# Patient Record
Sex: FEMALE
[De-identification: names, ages, dates, MRNs, and addresses within clinical notes are randomized per-mention and may not be internally consistent; named-entity substitution may affect disease eponyms.]

---

## 2018-06-23 ENCOUNTER — HOSPITAL ENCOUNTER (INPATIENT)
Dept: HOSPITAL 31 - C.EROB | Age: 33
LOS: 3 days | Discharge: HOME | DRG: 371 | End: 2018-06-26
Attending: OBSTETRICS & GYNECOLOGY | Admitting: OBSTETRICS & GYNECOLOGY
Payer: COMMERCIAL

## 2018-06-23 VITALS — BODY MASS INDEX: 40.5 KG/M2

## 2018-06-23 DIAGNOSIS — O41.03X0: Primary | ICD-10-CM

## 2018-06-23 DIAGNOSIS — Z3A.39: ICD-10-CM

## 2018-06-23 LAB
ALBUMIN SERPL-MCNC: 3.2 G/DL (ref 3.5–5)
ALBUMIN/GLOB SERPL: 1 {RATIO} (ref 1–2.1)
ALT SERPL-CCNC: 23 U/L (ref 9–52)
AST SERPL-CCNC: 23 U/L (ref 14–36)
BACTERIA #/AREA URNS HPF: (no result) /[HPF]
BASOPHILS # BLD AUTO: 0 K/UL (ref 0–0.2)
BASOPHILS NFR BLD: 0.2 % (ref 0–2)
BILIRUB UR-MCNC: NEGATIVE MG/DL
BUN SERPL-MCNC: 17 MG/DL (ref 7–17)
CALCIUM SERPL-MCNC: 8.9 MG/DL (ref 8.6–10.4)
EOSINOPHIL # BLD AUTO: 0.1 K/UL (ref 0–0.7)
EOSINOPHIL NFR BLD: 1.7 % (ref 0–4)
ERYTHROCYTE [DISTWIDTH] IN BLOOD BY AUTOMATED COUNT: 19.4 % (ref 11.5–14.5)
GFR NON-AFRICAN AMERICAN: > 60
GLUCOSE UR STRIP-MCNC: (no result) MG/DL
HGB BLD-MCNC: 11.4 G/DL (ref 11–16)
LEUKOCYTE ESTERASE UR-ACNC: (no result) LEU/UL
LYMPHOCYTES # BLD AUTO: 1.8 K/UL (ref 1–4.3)
LYMPHOCYTES NFR BLD AUTO: 20.6 % (ref 20–40)
MCH RBC QN AUTO: 25 PG (ref 27–31)
MCHC RBC AUTO-ENTMCNC: 32.8 G/DL (ref 33–37)
MCV RBC AUTO: 76.2 FL (ref 81–99)
MONOCYTES # BLD: 0.7 K/UL (ref 0–0.8)
MONOCYTES NFR BLD: 7.8 % (ref 0–10)
NEUTROPHILS # BLD: 6 K/UL (ref 1.8–7)
NEUTROPHILS NFR BLD AUTO: 69.7 % (ref 50–75)
NRBC BLD AUTO-RTO: 0.1 % (ref 0–2)
PH UR STRIP: 5 [PH] (ref 5–8)
PLATELET # BLD: 181 K/UL (ref 130–400)
PMV BLD AUTO: 10.2 FL (ref 7.2–11.7)
PROT UR STRIP-MCNC: (no result) MG/DL
RBC # BLD AUTO: 4.55 MIL/UL (ref 3.8–5.2)
RBC # UR STRIP: NEGATIVE /UL
SP GR UR STRIP: 1.03 (ref 1–1.03)
SQUAMOUS EPITHIAL: 12 /HPF (ref 0–5)
UROBILINOGEN UR-MCNC: NORMAL MG/DL (ref 0.2–1)
WBC # BLD AUTO: 8.6 K/UL (ref 4.8–10.8)

## 2018-06-23 NOTE — OBPN
===========================

Datetime: 2018 20:29

===========================

   

IP Progress Impression:  Arrest of dilatation/descent

IP Progress Plan:  Deliver- Section

Membranes, Provider:  Ruptured

Contraction Comments Provider:  q 2 

FHR - Baseline A Provider:  125

Gestation - Est Wks by US:  39.2

Fetal Presentation-Admit:  Vertex

IP Progress Note Comment:  pt seen and examined for progression of labor

   pt advsied on continuation with iol vs cxs

   VSS

   VE 2-3 unchange

   EFM: Cat I

   TOCO: q 2 min

      

   a/p  @ 39+ wks failure ot proress

   r/b/a/i pltcs dw paietn 

   antios

   or/anestheis aware

   abodmian prep

   car to gravity

Vital Signs Provider:  Reviewed; Within Normal Limits

FHR Category Provider Fetus A:  Category I

NICHD Variability Prov Fetus A:  Moderate 6-25bpm

Dilatation, Provider:  2-3

Effacement, Provider:  50

Station, Provider:  -3

NICHD Decel Fetus A IP Provider:  None

## 2018-06-23 NOTE — OBDS
===================================

DELIVERY PERSONNEL

===================================

   

Delivery Doctor:  DERRICK Polanco MD

Anesthesiologist:  DR. OCONNOR

   

===================================

MATERNAL INFORMATION

===================================

   

Delivery Anesthesia:  Epidural

Estimated  Blood Loss (ml):  800

Other Maternal Complications:  Oligo

Provider Comments:  pltcs , live female infant, tight nuchal x 1 reduced, 

   agpars 5 and 9 ebls 800 ml, normal appearing uterus, tubes and ovaries bilatearlly

   pediatrician presetn for delivery

   weight of 9bs 12 ounces

   

===================================

LABOR SUMMARY

===================================

   

EDC:  2018 00:00

No. Babies in Womb:  1

 Attempted:  No

Labor Anesthesia:  Epidural

   

===================================

LABOR INFORMATION

===================================

   

Reason for Induction:  Oligohydramnios

Onset of Labor:  2018 13:00

Cervical Ripening Agents:  Cytotec @ (Annotations: 25 mcg po )

Oxytocin:  Induction

Group B Beta Strep:  Negative

Steroids Given:  None

Reason Steroids Not Administered:  Not Applicable

   

===================================

MEMBRANES

===================================

   

Membranes Rupture Method:  Artificial

Rupture of Membranes:  2018 16:50 (Annotations: Data stored by [facility] on behalf of user)

Length of Rupture (hrs):  4.92

Amniotic Fluid Color:  Clear

Amniotic Fluid Amount:  Scant

Amniotic Fluid Odor:  Normal

   

===================================

STAGES OF LABOR

===================================

   

Stage 3 hrs:  0

Stage 3 min:  1

Total Time in Labor hrs:  8

Total Time in Labor min:  46

   

===================================

BABY A INFORMATION

===================================

   

Infant Delivery Date/Time:  2018 21:45

Method of Delivery:  

Born in Route :  No

:  N/A

Forceps:  N/A

Vacuum Extraction:  N/A

Shoulder Dystocia :  No

   

===================================

SHOULDER DYSTOCIA BABY A

===================================

   

Infant Delivery Date/Time:  2018 21:45

   

===================================

PRESENTATION/POSITION BABY A

===================================

   

Presentation:  Cephalic

Cephalic Presentation:  Vertex

Vertex Position:  Left Occipital Posterior

Breech Presentation:  N/A

   

===================================

PLACENTA INFORMATION BABY A

===================================

   

Placenta Delivery Time :  2018 21:46

Placenta Method of Delivery:  Manual Removal

Placenta Status:  Delivered

   

===================================

APGAR SCORES BABY A

===================================

   

Heart Rate 1 min:  Slow, Below 100 bpm

Resp Effort 1 min:  Slow, Irregular

Reflex Irritability 1 min:  Grimace

Muscle Tone 1 min:  Some Flexion of Extremities

Color 1 min:  Body Pink, Extremities Blue

Resuscitation Effort 1 min:  Tactile Stimulation; Oxygen

APGAR SCORE 1 MIN:  5

Heart Rate 5 min:  >100 bpm

Resp Effort 5 min:  Good Cry

Reflex Irritability 5 min:  Cough or Sneeze or Pulls Away

Muscle Tone 5 min:  Active Motion

Color 5 min:  Body Pink, Extremities Blue

Resuscitation Effort 5 min:  Tactile Stimulation

APGAR SCORE 5 MIN:  9

   

===================================

INFANT INFORMATION BABY A

===================================

   

Gestational Age at Delivery:  39.2

Gestational Status:  Term

Infant Outcome :  Liveborn

Infant Condition :  Stable

Infant Sex:  Female

   

===================================

IDENTIFICATION/MEDS BABY A

===================================

   

ID Band Number:  [id number]

Sensor Number:  [id number]

   

===================================

WEIGHT/LENGTH BABY A

===================================

   

Infant Birthweight (gms):  4425

Infant Weight (lb):  9

Infant Weight (oz):  12

Infant Length Inches:  20.50

Infant Length cms:  52.1

   

===================================

CORD INFORMATION BABY A

===================================

   

No. Cord Vessels:  3

Nuchal Cord :  Around Neck x1, Loose

Cord Blood Taken:  Yes

Infant Suction:  Mouth; Nose

## 2018-06-23 NOTE — OBHP
===========================

Datetime: 2018 08:48

===========================

   

IP Adm Impression:  Term, intrauterine pregnancy

IP Admit Plan:  Admit to unit; Initiate labor induction protocol

Admit Comment, IP Provider:  31 y/o  @ 39.2 wks GA c/o mucous type watery dicharge, no pain, dnei
s ctx, +FM, denies vb

      

   OB: P0

   GYN: Denies hx of abnormal pap, fibroids, ovarn cyst

   PMH: Hypothryoid

   PSH: dneies

   FH:X non contriubtory

   MEDS: Syntrhoid 112mcg, prenatal vitamin

   NKDA

   SHX: negative etoh/tobacc/durgs

      

   A/P  @ 39.2 wks GA IOL for oligohdyramnios

   admit to L+D

   npo, ivf

   admission labs

   cytotec

   pain maamget prn

Pelvic Type - PN:  Adequate

Extremities - PN:  Normal

Abdomen - PN:  Normal

Back - PN:  Normal

Breast - PN:  Normal

Lungs - PN:  Normal

Heart - PN:  Normal

Thyroid - PN:  Normal

Neurologic - PN:  Normal

HEENT - PN:  Normal

General - PN:  Normal

Fetal Presentation-Admit:  Vertex

FHR - Baseline A Provider:  145

Membranes, Provider:  Intact

Contraction Comments Provider:  irregular 

Comments, ACOG Physical Exam:  ultrasound JOI 4.7cm, vtx

Gestation - Est Wks by US:  39.2

IP Prenatal Hx Assessment:  The Prenatal History has been Reviewed and is Current

EGA AdmitDate IP:  39.2

Vital Signs Provider:  Reviewed

IP Chief Complaint:  Suspected ruptured membranes

NICHD Variability Prov Fetus A:  Moderate 6-25bpm

NICHD Decel Fetus A IP Provider:  None

Dilatation, Provider:  2

Effacement, Provider:  50

Station, Provider:  -3

Genitourinary Exam:  Normal

DTRs - PN:  Normal

## 2018-06-23 NOTE — OBPN
===========================

Datetime: 2018 12:57

===========================

   

IP Progress Impression:  Normal progression of labor

IP Progress Plan:  Continue present management

Membranes, Provider:  Intact

Contraction Comments Provider:  q 5 min

FHR - Baseline A Provider:  125

Gestation - Est Wks by US:  39.2

Fetal Presentation-Admit:  Vertex

IP Progress Note Comment:  pt seen and examined reports some cramping de jeuss snot want pian caroleeciiaodrake, d
enie svb, +FM

      

   VSS

   VE 2-3

   EFM: Cat I

   TOOC: q 5 mn irreula

      

   a/p  @ 39.2 wks IOL for oligohydamnis

   -s/p cytotoic

   -pitoicn as per protoocl

   -anstheis prn

      

Vital Signs Provider:  Reviewed; Within Normal Limits

FHR Category Provider Fetus A:  Category I

NICHD Variability Prov Fetus A:  Moderate 6-25bpm

Dilatation, Provider:  2-3

Effacement, Provider:  50

Station, Provider:  -3

NICHD Decel Fetus A IP Provider:  None

## 2018-06-23 NOTE — PCM.SURG1
Surgeon's Initial Post Op Note





- Surgeon's Notes


Surgeon: Linh Polanco MD


Assistant: Rip Olson MD


Type of Anesthesia: Other


Anesthesia Administered By: Dr Fang


Pre-Operative Diagnosis: Failure to progress


Operative Findings: live female infant, apgars 5, 9 weight of 9lbs 12 ounces. 

normal appearing uterus, tubes and ovaries bilaterally. pediatrician prsetn for 

delivery. Dr Rip Olson was surgical assistant and essential in gainign entry

, retraction, epxoure, holding bladder blade and closing all layers and was 

presetn for entire case.


Post-Operative Diagnosis: same as above, fetal macrosomia


Operation Performed: Primary low transverse  section


Specimen/Specimens Removed: placenta


Estimated Blood Loss: EBL {In ML}: 800


Blood Products Given: N/A


Drains Used: No Drains


Post-Op Condition: Good


Date of Surgery/Procedure: 18


Time of Surgery/Procedure: 22:00

## 2018-06-23 NOTE — OBADHP
===========================

Datetime: 2018 08:48

===========================

   

Admit Comment, IP Provider:  31 y/o  @ 39.2 wks GA c/o mucous type watery dicharge, no pain, dnei
s ctx, +FM, denies vb

      

   OB: P0

   GYN: Denies hx of abnormal pap, fibroids, ovarn cyst

   PMH: Hypothryoid

   PSH: dneies

   FH:X non contriubtory

   MEDS: Syntrhoid 112mcg, prenatal vitamin

   NKDA

   SHX: negative etoh/tobacc/durgs

      

   A/P  @ 39.2 wks GA IOL for oligohdyramnios

   admit to L+D

   npo, ivf

   admission labs

   cytotec

   pain maamget prn

Pelvic Type - PN:  Adequate

Extremities - PN:  Normal

Abdomen - PN:  Normal

Back - PN:  Normal

Breast - PN:  Normal

Lungs - PN:  Normal

Heart - PN:  Normal

Thyroid - PN:  Normal

Neurologic - PN:  Normal

HEENT - PN:  Normal

General - PN:  Normal

Fetal Presentation-Admit:  Vertex

FHR - Baseline A Provider:  145

Membranes, Provider:  Intact

Contraction Comments Provider:  irregular 

Comments, ACOG Physical Exam:  ultrasound JOI 4.7cm, vtx

Gestation - Est Wks by US:  39.2

IP Prenatal Hx Assessment:  The Prenatal History has been Reviewed and is Current

Vital Signs Provider:  Reviewed

IP Chief Complaint:  Suspected ruptured membranes

NICHD Variability Prov Fetus A:  Moderate 6-25bpm

NICHD Decel Fetus A IP Provider:  None

Dilatation, Provider:  2

Effacement, Provider:  50

Station, Provider:  -3

Genitourinary Exam:  Normal

DTRs - PN:  Normal

EGA AdmitDate IP:  39.2

IP Adm Impression:  Term, intrauterine pregnancy

IP Admit Plan:  Admit to unit; Initiate labor induction protocol

## 2018-06-23 NOTE — OBPN
===========================

Datetime: 2018 16:50

===========================

   

IP Procedures:  Artificial ROM

IP Progress Plan:  Continue present management

Membranes, Provider:  Ruptured

Amniotic Fluid Color, Provider:  Clear

FHR - Baseline A Provider:  125

Gestation - Est Wks by US:  39.2

Fetal Presentation-Admit:  Vertex

IP Progress Note Comment:  pt seen and examined and reports pain requestng medicaion. pt checked for 
prgressin of labor

   VSS

   EFM: Cat I

   TOCO: q 2-5 min

   VE: 3/50/-3 AROM, clear, scant

      

   pitocin 10mu/min

      

   A/P  @ 39.2 wks GA IOL for oligohydamnios

   -piotcn sa per protocol

   -cont toco adn efm

   -pain medication IV Nubain, benadryl

Vital Signs Provider:  Reviewed; Within Normal Limits

FHR Category Provider Fetus A:  Category I

NICHD Variability Prov Fetus A:  Moderate 6-25bpm

Dilatation, Provider:  3

Effacement, Provider:  50

Station, Provider:  -3

## 2018-06-24 LAB
ALBUMIN SERPL-MCNC: 2.6 G/DL (ref 3.5–5)
ALBUMIN/GLOB SERPL: 1 {RATIO} (ref 1–2.1)
ALT SERPL-CCNC: 26 U/L (ref 9–52)
AST SERPL-CCNC: 27 U/L (ref 14–36)
BASOPHILS # BLD AUTO: 0 K/UL (ref 0–0.2)
BASOPHILS NFR BLD: 0.2 % (ref 0–2)
BUN SERPL-MCNC: 12 MG/DL (ref 7–17)
CALCIUM SERPL-MCNC: 8.6 MG/DL (ref 8.6–10.4)
EOSINOPHIL # BLD AUTO: 0 K/UL (ref 0–0.7)
EOSINOPHIL NFR BLD: 0.2 % (ref 0–4)
ERYTHROCYTE [DISTWIDTH] IN BLOOD BY AUTOMATED COUNT: 19.1 % (ref 11.5–14.5)
GFR NON-AFRICAN AMERICAN: > 60
HGB BLD-MCNC: 10.9 G/DL (ref 11–16)
LYMPHOCYTES # BLD AUTO: 1.2 K/UL (ref 1–4.3)
LYMPHOCYTES NFR BLD AUTO: 10.1 % (ref 20–40)
MCH RBC QN AUTO: 24.2 PG (ref 27–31)
MCHC RBC AUTO-ENTMCNC: 32.1 G/DL (ref 33–37)
MCV RBC AUTO: 75.4 FL (ref 81–99)
MONOCYTES # BLD: 0.7 K/UL (ref 0–0.8)
MONOCYTES NFR BLD: 5.9 % (ref 0–10)
NEUTROPHILS # BLD: 10.2 K/UL (ref 1.8–7)
NEUTROPHILS NFR BLD AUTO: 83.6 % (ref 50–75)
NRBC BLD AUTO-RTO: 0.1 % (ref 0–2)
PLATELET # BLD: 167 K/UL (ref 130–400)
PMV BLD AUTO: 9.9 FL (ref 7.2–11.7)
RBC # BLD AUTO: 4.52 MIL/UL (ref 3.8–5.2)
WBC # BLD AUTO: 12.2 K/UL (ref 4.8–10.8)

## 2018-06-24 RX ADMIN — PRENATAL VIT W/ FE FUMARATE-FA TAB 27-0.8 MG SCH TAB: 27-0.8 TAB at 09:29

## 2018-06-24 RX ADMIN — OXYCODONE HYDROCHLORIDE AND ACETAMINOPHEN PRN TAB: 5; 325 TABLET ORAL at 13:27

## 2018-06-24 RX ADMIN — OXYCODONE HYDROCHLORIDE AND ACETAMINOPHEN PRN TAB: 5; 325 TABLET ORAL at 22:02

## 2018-06-24 RX ADMIN — SIMETHICONE CHEW TAB 80 MG SCH MG: 80 TABLET ORAL at 17:05

## 2018-06-24 RX ADMIN — SIMETHICONE CHEW TAB 80 MG SCH MG: 80 TABLET ORAL at 13:21

## 2018-06-24 RX ADMIN — OXYCODONE HYDROCHLORIDE AND ACETAMINOPHEN PRN TAB: 5; 325 TABLET ORAL at 09:28

## 2018-06-24 RX ADMIN — SIMETHICONE CHEW TAB 80 MG SCH MG: 80 TABLET ORAL at 22:02

## 2018-06-24 RX ADMIN — SIMETHICONE CHEW TAB 80 MG SCH MG: 80 TABLET ORAL at 09:29

## 2018-06-24 NOTE — OP
PROCEDURE DATE:  2018



PREOPERATIVE DIAGNOSIS:  Failure to progress.



POSTOPERATIVE DIAGNOSES:  Failure to progress, fetal macrosomia.



OPERATIVE FINDINGS:  Live female infant.  Apgars 5 and 9, weight of 9

pounds 12 ounces, normal-appearing uterus, tubes, and ovaries bilaterally. 

Pediatrician present for delivery.  Dr Marisol Severino was surgical

assistant who was present for the entire case and assisted in gaining

entry, retraction, exposure, holding the bladder blade, helping to closing

all layers, and present for the entire case.



SURGEON:  Linh Polanco MD



ASSISTANT:  Rip Severino MD



TYPE OF ANESTHESIA:  Epidural.



ANESTHESIA ADMINISTERED BY:  _____.



OPERATION PERFORMED:  Primary low transverse  section.



SPECIMENS REMOVED:  Placenta.



ESTIMATED BLOOD LOSS:  800 mL.



BLOOD PRODUCTS:  None.



COMPLICATIONS:  None.



DESCRIPTION OF PROCEDURE:  The patient was taken to the operating room

where she had epidural anesthesia.  Once it was found to be adequate, the

patient was positioned on the operating room table in dorsal supine

position.  The patient was then prepped and draped in the usual sterile

fashion.  A time-out confirmed correct patient and correct procedure. 

_____, a Pfannenstiel skin incision was made with a scalpel and carried

down to the to the underlying fascia with Bovie.  The fascia was incised in

the midline, and the incision was extended laterally with Bovie.  The

inferior aspect of the fascial incision was grasped with Allis and Kocher

clamps, and the underlying rectus muscles were dissected off bluntly. 

Attention was then turned to the superior aspect with the similar fashion. 

It was grasped with Allis and Kocher clamps, and the underlying rectus

muscles were dissected bluntly.  The rectus muscle was then bluntly

 in the midline.  The peritoneum was identified and entered into

clear space.  The incision was extended laterally and superiorly until

there was good visualization of the bladder.  The lower end of the [place]

was then inserted.  The lower uterine segment was incised in a transverse

fashion.  The bladder flap was created digitally.  The Lower end of the

[place] was then re-inserted.  Lower uterine segment was incised with the

scalpel.  The uterine incision was extended laterally bluntly.  The

surgeon's hand entered the uterine cavity.  The infant's head was delivered

atraumatically.  There was nuchal cord x1 that was reduced, followed by

delivery of the shoulders, followed by delivery of the body.  Both oral and

nasal passages of the baby were bulb suctioned.  The umbilical cord was

clamped and cut.  Baby was handed off to the awaiting pediatrician.  Cord

blood and cord gases were collected and sent x2.  The placenta was then

delivered manually.  The uterus was exteriorized and cleared of all clots

and debris.  The uterine incision was repaired with 0-Vicryl in a running

continuous locked fashion.  A second layer of the same suture was used to

close the uterus in a running imbricated manner.  There was good hemostasis

at the uterine incision site.  There were normal tubes and ovaries.  The

uterus was returned to the abdomen.  The paracolic gutters were cleared of

all clots and debris.  The peritoneum was reapproximated and closed with

2-0 chromic in running continuous fashion.  The rectus was reapproximated

with 2-0 chromic in an interrupted manner.  The fascia was reapproximated

and closed with 0 Vicryl in a running continuous fashion, subcutaneous

layers with 2-0 plain, and then skin was reapproximated with 4-0 Monocryl

in a running subcuticular fashion.  At the end of the procedure, all

needles, sponge, and instrument counts were noted to be correct x2.  The

patient tolerated the procedure well and was transferred to the recovery

room in stable condition.





__________________________________________

Linh Polanco MD





DD:  2018 22:35:04

DT:  2018 3:11:25

Job # [id number]

## 2018-06-24 NOTE — OBPPN
===========================

Datetime: 06/24/2018 10:05

===========================

   

PP Pain Prov:  Within normal limits

PP Nausea Prov:  Denies

PP Flatus Prov:  Yes

PP BM Prov:  No

PP Breasts Prov:  Normal

PP Heart Prov:  Normal

PP Lungs Prov:  Normal

PP Abdomen/Uterus Prov:  Normal

PP Lochia Prov:  Normal

PP Vulva/Perineum Prov:  Normal

PP CVA Tenderness Prov:  Normal

PP Extremities Prov:  Normal

PP C/S Incision Prov:  Normal

PP Breastfeeding Progress Prov:  Normal

PP Impression Prov:  Normal postpartum progression

PP Plan Prov:  Continue present management

PP Progress Note Prov:  pt seen and examiend and rporet pain over incision. pt dnie sany fever, chill
s, nasue, vomiting, cp, sob. +car

   pt states called hposital adn baby is doing well, transferred last night.

      

   VSS

   PE see above

   a/P s/p PLTCS POD #1

   dc car

   pain manamgnet

   out of bed, ambulation with asisstance

   advance diet

   am labs

   enocurage breast feeding

   incentive spirometer, abodmina binder

IP PP Procedures:  None

Vital Signs Provider PP:  Reviewed; Within Normal Limits

## 2018-06-25 RX ADMIN — PRENATAL VIT W/ FE FUMARATE-FA TAB 27-0.8 MG SCH TAB: 27-0.8 TAB at 09:57

## 2018-06-25 RX ADMIN — SIMETHICONE CHEW TAB 80 MG SCH MG: 80 TABLET ORAL at 17:28

## 2018-06-25 RX ADMIN — SIMETHICONE CHEW TAB 80 MG SCH MG: 80 TABLET ORAL at 22:35

## 2018-06-25 RX ADMIN — OXYCODONE HYDROCHLORIDE AND ACETAMINOPHEN PRN TAB: 5; 325 TABLET ORAL at 06:23

## 2018-06-25 RX ADMIN — SIMETHICONE CHEW TAB 80 MG SCH MG: 80 TABLET ORAL at 13:39

## 2018-06-25 RX ADMIN — SIMETHICONE CHEW TAB 80 MG SCH MG: 80 TABLET ORAL at 09:57

## 2018-06-25 NOTE — OBPPN
===========================

Datetime: 06/25/2018 04:28

===========================

   

PP Pain Prov:  Within normal limits

PP Nausea Prov:  Denies

PP Flatus Prov:  Yes

PP BM Prov:  No

PP Breasts Prov:  Normal

PP Heart Prov:  Normal

PP Lungs Prov:  Normal

PP Abdomen/Uterus Prov:  Normal

PP Lochia Prov:  Normal

PP Vulva/Perineum Prov:  Normal

PP CVA Tenderness Prov:  Normal

PP Extremities Prov:  Normal

PP C/S Incision Prov:  Normal

PP Breastfeeding Progress Prov:  Normal

PP Comments Phys Exam Prov:  incision c/d/ih ealing well

   no uterine tendnerss

PP Impression Prov:  Normal postpartum progression

PP Plan Prov:  Continue present management

PP Progress Note Prov:  pt seen and examiend and rporet pain contorlled iwht mds. pt dnie sany fever,
 chills, nasue, vomiting, cp, sob.pt mabuaitn adn voiidng, passing flatus, tolerating diet

      

   VSS

   PE see above

   a/P s/p PLTCS POD #2

   pain manamgnet

   ambulation with asisstance

   regular dite

   enocurage breast feeding

   incentive spirometer, abodmina binder

Vital Signs Provider PP:  Reviewed; Within Normal Limits

## 2018-06-26 VITALS
TEMPERATURE: 97.4 F | HEART RATE: 77 BPM | OXYGEN SATURATION: 100 % | DIASTOLIC BLOOD PRESSURE: 68 MMHG | RESPIRATION RATE: 18 BRPM | SYSTOLIC BLOOD PRESSURE: 137 MMHG

## 2018-06-26 RX ADMIN — SIMETHICONE CHEW TAB 80 MG SCH MG: 80 TABLET ORAL at 10:13

## 2018-06-26 RX ADMIN — PRENATAL VIT W/ FE FUMARATE-FA TAB 27-0.8 MG SCH TAB: 27-0.8 TAB at 10:13

## 2018-06-26 NOTE — OBPPN
===========================

Datetime: 06/26/2018 16:17

===========================

   

PP Pain Prov:  Within normal limits

PP Nausea Prov:  Denies

PP Flatus Prov:  Yes

PP Breasts Prov:  Normal

PP Heart Prov:  Normal

PP Lungs Prov:  Normal

PP Abdomen/Uterus Prov:  Normal

PP Lochia Prov:  Normal

PP Vulva/Perineum Prov:  Normal

PP CVA Tenderness Prov:  Normal

PP Extremities Prov:  Normal

PP C/S Incision Prov:  Normal

PP Breastfeeding Progress Prov:  Normal

PP Impression Prov:  Normal postpartum progression

PP Plan Prov:  Discharge

PP Progress Note Prov:  pt seen and examiend and rporet pain contorlled iwht mds. pt dnie sany fever,
 chills, nasue, vomiting, cp, sob.pt mabuaitn adn voiidng, passing flatus, tolerating diet

      

   VSS

   PE see above

   a/P s/p PLTCS POD #3

   dc home

   rto 1 week

   precauitn givne

Vital Signs Provider PP:  Reviewed; Within Normal Limits

## 2018-06-26 NOTE — OBDCSUM
===========================

Datetime: 06/26/2018 09:08

===========================

   

Discharged to, Provider:  Home

Follow up at, Provider:  dr giron

Disch Instr Activity:  Normal activity; May Shower

Disch Instr Diet:  Regular

Discharge Diet restrict Prov:  none

Discharge Instructions, Provider:  Routine instructions given

Discharge Diagnosis, Provider:  Term Pregnancy Delivered

Discharge Time:  06/26/2018 13:45

Follow up in weeks, Provider:  one week

Disch Referrals:  None

Contraception discussed, Prov:  Yes

Disch Activity Restrictions:  No exercising; No lifting; No sexual activity; Nothing in vagina - Inte
rcourse, tampons, douche

Contraception after Delivery:  Not Planning to Use

## 2018-06-28 ENCOUNTER — HOSPITAL ENCOUNTER (EMERGENCY)
Dept: HOSPITAL 31 - C.ER | Age: 33
Discharge: HOME | End: 2018-06-28
Payer: COMMERCIAL

## 2018-06-28 VITALS — HEART RATE: 88 BPM | DIASTOLIC BLOOD PRESSURE: 72 MMHG | SYSTOLIC BLOOD PRESSURE: 126 MMHG

## 2018-06-28 VITALS — RESPIRATION RATE: 20 BRPM | TEMPERATURE: 98.8 F

## 2018-06-28 VITALS — OXYGEN SATURATION: 100 %

## 2018-06-28 VITALS — BODY MASS INDEX: 40.5 KG/M2

## 2018-06-28 DIAGNOSIS — N64.59: Primary | ICD-10-CM

## 2018-06-28 DIAGNOSIS — E03.9: ICD-10-CM

## 2018-06-28 NOTE — C.PDOC
History Of Present Illness


32 year old female presents to the ED complaining of swelling at the right 

axilla, noticed this evening. Of note, patient delivered her baby 4 days ago 

and she believes her milk came in yesterday. Patient reports her breasts feel 

heavy, and she is unable to breastfeed as baby is still in the hospital. Her 

pump arrives tomorrow. She otherwise denies any fever, chills, sob, chest pain, 

or increased redness or warmth at the site.





Time Seen by Provider: 18 00:22


Chief Complaint (Nursing): Abnormal Skin Integrity


History Per: Patient


History/Exam Limitations: no limitations


Onset/Duration Of Symptoms: Hrs


Current Symptoms Are (Timing): Still Present





Past Medical History


Reviewed: Historical Data, Nursing Documentation, Vital Signs


Vital Signs: 


 Last Vital Signs











Temp  98.8 F   18 00:18


 


Pulse  88   18 01:13


 


Resp  20   18 01:13


 


BP  126/72   18 01:13


 


Pulse Ox  100   18 02:43














- Medical History


PMH: Hypothyroidism


   Denies: Depression, Diabetes, HTN


Surgical History: 


Family History: States: No Known Family Hx





- Social History


Hx Alcohol Use: No


Hx Substance Use: No





- Immunization History


Hx Tetanus Toxoid Vaccination: No


Hx Influenza Vaccination: No


Hx Pneumococcal Vaccination: No





Review Of Systems


Constitutional: Negative for: Fever, Chills


Skin: Positive for: Other (swelling to right axilla).  Negative for: Rash





Physical Exam





- Physical Exam


Appears: Well, Non-toxic, No Acute Distress


Skin: Warm, Dry


Head: Atraumatic, Normacephalic


Eye(s): bilateral: Normal Inspection, EOMI


Nose: Normal


Oral Mucosa: Moist


Neck: Normal ROM, Supple


Lymphatic: Other (3x3 cm area of swelling at right axilla with no induration, 

tenderness, warmth, or erythema)


Chest: Symmetrical, Other (Tenderness and some induration noted at 6 o'clock 

position on left breast; no warmth, fluctuance, erythema, or nipple discharge)


Cardiovascular: Rhythm Regular


Respiratory: Normal Breath Sounds, No Accessory Muscle Use, Other (speaking in 

full sentences)


Extremity: Normal ROM, No Deformity


Neurological/Psych: Oriented x3, Normal Speech, Other (No focal deficits)





ED Course And Treatment


O2 Sat by Pulse Oximetry: 100


Progress Note: Discussed with patient that her breasts appear engorged, 

swelling likely from the milk production. Instructed patient to massage and 

apply warm compresses at home. Manual pump provided in the ED. Patient is 

stable for discharge home. Advised patient to fill prescription for antibiotics 

only if the area becomes red, increasingly swollen, or she develops fever.





Disposition


Counseled Patient/Family Regarding: Diagnosis, Need For Followup, Rx Given





- Disposition


Disposition: HOME/ ROUTINE


Disposition Time: 00:45


Condition: STABLE


Additional Instructions: 


Apply warm compresses. Massage the area. Empty the breast with feeding/pumping. 

Take motrin for pain. If the area becomes red , increasing swollen, or you get 

a fever, fill the antibiotic. Follow up with your OBGYN in 1-2 days. 


Prescriptions: 


Cephalexin [cephalexin] 500 mg PO QID 7 Days  cap


Instructions:  Breast Care for the Breastfeeding Woman


Forms:  [facility] (English)





- POA


Present On Arrival: None





- Clinical Impression


Clinical Impression: 


 Milk engorgement of breast








- PA / NP / Resident Statement


MD/DO has reviewed & agrees with the documentation as recorded.





- Scribe Statement


The provider has reviewed the documentation as recorded by the Scribe (Shell Faustin)





All medical record entries made by the Scribe were at my direction and 

personally dictated by me. I have reviewed the chart and agree that the record 

accurately reflects my personal performance of the history, physical exam, 

medical decision making, and the department course for this patient. I have 

also personally directed, reviewed, and agree with the discharge instructions 

and disposition.